# Patient Record
Sex: FEMALE | Race: WHITE | NOT HISPANIC OR LATINO | URBAN - METROPOLITAN AREA
[De-identification: names, ages, dates, MRNs, and addresses within clinical notes are randomized per-mention and may not be internally consistent; named-entity substitution may affect disease eponyms.]

---

## 2017-09-07 ENCOUNTER — IMPORTED ENCOUNTER (OUTPATIENT)
Dept: URBAN - METROPOLITAN AREA CLINIC 50 | Facility: CLINIC | Age: 67
End: 2017-09-07

## 2017-09-18 ENCOUNTER — IMPORTED ENCOUNTER (OUTPATIENT)
Dept: URBAN - METROPOLITAN AREA CLINIC 50 | Facility: CLINIC | Age: 67
End: 2017-09-18

## 2017-09-19 ENCOUNTER — IMPORTED ENCOUNTER (OUTPATIENT)
Dept: URBAN - METROPOLITAN AREA CLINIC 50 | Facility: CLINIC | Age: 67
End: 2017-09-19

## 2018-10-16 ENCOUNTER — IMPORTED ENCOUNTER (OUTPATIENT)
Dept: URBAN - METROPOLITAN AREA CLINIC 50 | Facility: CLINIC | Age: 68
End: 2018-10-16

## 2018-11-26 ENCOUNTER — IMPORTED ENCOUNTER (OUTPATIENT)
Dept: URBAN - METROPOLITAN AREA CLINIC 50 | Facility: CLINIC | Age: 68
End: 2018-11-26

## 2020-02-11 ENCOUNTER — IMPORTED ENCOUNTER (OUTPATIENT)
Dept: URBAN - METROPOLITAN AREA CLINIC 50 | Facility: CLINIC | Age: 70
End: 2020-02-11

## 2020-12-29 NOTE — PATIENT DISCUSSION
2. PRK SET FOR MONOVISION - ONE EYE DISTANCE AND THE OTHER NEAR, PATIENT UNDERSTANDS SHE MAY STILL NEED READERS FOR SMALL PRINT/LOW LIGHTING IN THE FUTURE

## 2020-12-29 NOTE — PATIENT DISCUSSION
DISPENSED MV TRIALS TODAY, PATIENT TO TRIAL FOR AT LEAST 1 WK BEFORE DECIDING BTW DVO VS MV [place] OU. PATIENT ED TO BE OUT OF CLS 3 DAYS PRIOR TO NEXT VISIT.

## 2020-12-29 NOTE — PATIENT DISCUSSION
1. PRK SETTING OU FOR DISTANCE AND PT WEAR READING GLASSES FOR ALL NEAR AND INTERMEDIATE TASKS IN THE NEAR FUTURE

## 2021-04-18 ASSESSMENT — VISUAL ACUITY
OD_CC: J1+
OS_CC: 20/20-
OS_CC: J1+
OD_CC: 20/25+
OS_CC: 20/25+
OD_CC: 20/20

## 2021-04-18 ASSESSMENT — TONOMETRY
OD_IOP_MMHG: 14
OS_IOP_MMHG: 12
OD_IOP_MMHG: 11
OS_IOP_MMHG: 14